# Patient Record
Sex: MALE | URBAN - METROPOLITAN AREA
[De-identification: names, ages, dates, MRNs, and addresses within clinical notes are randomized per-mention and may not be internally consistent; named-entity substitution may affect disease eponyms.]

---

## 2020-06-10 ENCOUNTER — APPOINTMENT (RX ONLY)
Dept: URBAN - METROPOLITAN AREA CLINIC 45 | Facility: CLINIC | Age: 25
Setting detail: DERMATOLOGY
End: 2020-06-10

## 2020-06-10 DIAGNOSIS — L51.0 NONBULLOUS ERYTHEMA MULTIFORME: ICD-10-CM

## 2020-06-10 DIAGNOSIS — L85.3 XEROSIS CUTIS: ICD-10-CM

## 2020-06-10 DIAGNOSIS — D22 MELANOCYTIC NEVI: ICD-10-CM

## 2020-06-10 PROBLEM — L30.9 DERMATITIS, UNSPECIFIED: Status: ACTIVE | Noted: 2020-06-10

## 2020-06-10 PROBLEM — D22.5 MELANOCYTIC NEVI OF TRUNK: Status: ACTIVE | Noted: 2020-06-10

## 2020-06-10 PROCEDURE — 99203 OFFICE O/P NEW LOW 30 MIN: CPT | Mod: 25

## 2020-06-10 PROCEDURE — ? COUNSELING

## 2020-06-10 PROCEDURE — 11105 PUNCH BX SKIN EA SEP/ADDL: CPT

## 2020-06-10 PROCEDURE — ? TREATMENT REGIMEN

## 2020-06-10 PROCEDURE — 11104 PUNCH BX SKIN SINGLE LESION: CPT

## 2020-06-10 PROCEDURE — ? BIOPSY BY PUNCH METHOD

## 2020-06-10 PROCEDURE — ? PRESCRIPTION

## 2020-06-10 ASSESSMENT — LOCATION SIMPLE DESCRIPTION DERM
LOCATION SIMPLE: LEFT CLAVICULAR SKIN
LOCATION SIMPLE: LEFT HAND
LOCATION SIMPLE: RIGHT HAND

## 2020-06-10 ASSESSMENT — LOCATION ZONE DERM
LOCATION ZONE: TRUNK
LOCATION ZONE: HAND

## 2020-06-10 ASSESSMENT — LOCATION DETAILED DESCRIPTION DERM
LOCATION DETAILED: LEFT RADIAL PALM
LOCATION DETAILED: RIGHT ULNAR PALM
LOCATION DETAILED: LEFT DORSAL RING METACARPOPHALANGEAL JOINT
LOCATION DETAILED: LEFT ULNAR DORSAL HAND
LOCATION DETAILED: LEFT CLAVICULAR SKIN

## 2020-06-10 NOTE — PROCEDURE: TREATMENT REGIMEN
Detail Level: Zone
Plan: Location: Lips, mouth, hands, feet\\nPrescribed: Prednisone 20mg take 2 tablets daily.\\nPharmacy: [facility]\\n\\nNew Pt\\nPhotos taken\\n\\nPt comes in today with severe concern of blistering on mouth, hands and feet\\nPt states this started about a week and a half ago.\\nWhat he recalls is that he went for his Remicade Infusion Treatment \\nPt has been on Remicaide for Chrons disease for 10 years\\nAfter his treatment it was about 2 days later started with this blistering.\\nPt states the blisters started in his mouth and the top of his pallet and then it moved on to his lips.\\nPt states after a day having them the blistering started on his hands and as well feet.\\nHe went to see his PCP Dr. Cipriano Avila and was given a Prednisone injection and was given a Z-pack \\nThe next day he got a call back from PCP and was told to not take it, so he stopped\\nHe was also given a lidocaine mouth wash to allow him to at least drink water.\\nHe also was put on amoxicillin and the inflammation went down\\nHis mouth is very sore and pt stated that today’s picture is actually shows improvement about 20 times better\\nHis PCP gave him Valtrex yesterday 06/09/2020\\nPt states he is taking 3 Valtrex a daily\\n\\nDiscussed with pt:\\nI think that this is likely ERYTHEMA MULTIFORMA----though could be Transepidermal Necrosis or Simeon-Maykel or even Pemphigus Vulgaris\\nWITH ANY OF THESE DIAGNOSES----PREDNISONE WILL BE HELPFUL (40MG DAILY)\\nI don't really think that is caused by the Remicade---he has been on it for many years\\nI want him to continue his Valacyclovir to treat a possible HSV that might be spurring on EM---also on the Amoxicillin to cover for Mycoplasma which can also be a factor\\nRelative to medium high dose of steroid will help him and I will prescribe him Prednisone 20mg take two tablets daily\\nThe steroid pack that was given to him was not strong enough, and he does not have to be off the steroids---which I think was done in case this might be an infection\\nI REALLY DON'T THINK THAT THIS IS INFECTION AND I THINK THAT THE STEROIDS WILL BE CRUCIAL TO HIM GETTING BACK TO NORMAL\\nToday I will perform a punch biopsy to  determine his diagnosis and we will check on him tomorrow \\nI will see him in one week even though the sutures do not come out until in 2 weeks.\\nPt is to call if he worsens. \\n\\nFollow up 1 week.\\n

## 2020-06-10 NOTE — PROCEDURE: BIOPSY BY PUNCH METHOD
Detail Level: Detailed
Was A Bandage Applied: Yes
Punch Size In Mm: 6
Biopsy Type: H and E
Anesthesia Type: 1% lidocaine with epinephrine
Anesthesia Volume In Cc (Will Not Render If 0): 0.5
Additional Anesthesia Volume In Cc (Will Not Render If 0): 0
Hemostasis: None
Epidermal Sutures: 4-0 Ethilon
Wound Care: Bacitracin
Dressing: bandage
Suture Removal: 7 days
Patient Will Remove Sutures At Home?: No
Lab: [id number]
Lab Facility: [id number]
Consent: Written consent was obtained and risks were reviewed including but not limited to scarring, infection, bleeding, scabbing, incomplete removal, nerve damage and allergy to anesthesia.
Post-Care Instructions: I reviewed with the patient in detail post-care instructions. Patient is to keep the biopsy site dry overnight, and then apply bacitracin twice daily until healed. Patient may apply hydrogen peroxide soaks to remove any crusting.
Home Suture Removal Text: Patient was provided a home suture removal kit and will remove their sutures at home.  If they have any questions or difficulties they will call the office.
Notification Instructions: Patient will be notified of biopsy results. However, patient instructed to call the office if not contacted within 2 weeks.
Billing Type: Third-Party Bill
Information: Selecting Yes will display possible errors in your note based on the variables you have selected. This validation is only offered as a suggestion for you. PLEASE NOTE THAT THE VALIDATION TEXT WILL BE REMOVED WHEN YOU FINALIZE YOUR NOTE. IF YOU WANT TO FAX A PRELIMINARY NOTE YOU WILL NEED TO TOGGLE THIS TO 'NO' IF YOU DO NOT WANT IT IN YOUR FAXED NOTE.
Punch Size In Mm: 4
Deep Sutures: 5-0 Vicryl
Epidermal Sutures: 5-0 Ethilon
Lab: [id number]
Lab Facility: [id number]
Path Notes (To The Dermatopathologist): DIF immunoflourescence
Home Suture Removal Text: Patient was provided a home suture removal kit and will remove their sutures at home.  If they have any questions or difficulties they will call the office.
Billing Type: Third-Party Bill

## 2020-06-17 ENCOUNTER — APPOINTMENT (RX ONLY)
Dept: URBAN - METROPOLITAN AREA CLINIC 45 | Facility: CLINIC | Age: 25
Setting detail: DERMATOLOGY
End: 2020-06-17

## 2020-06-17 ENCOUNTER — RX ONLY (OUTPATIENT)
Age: 25
Setting detail: RX ONLY
End: 2020-06-17

## 2020-06-17 DIAGNOSIS — L51.0 NONBULLOUS ERYTHEMA MULTIFORME: ICD-10-CM

## 2020-06-17 DIAGNOSIS — L85.3 XEROSIS CUTIS: ICD-10-CM

## 2020-06-17 PROCEDURE — ? COUNSELING

## 2020-06-17 PROCEDURE — ? TREATMENT REGIMEN

## 2020-06-17 PROCEDURE — 99213 OFFICE O/P EST LOW 20 MIN: CPT

## 2020-06-17 PROCEDURE — ? PRESCRIPTION

## 2020-06-17 ASSESSMENT — LOCATION SIMPLE DESCRIPTION DERM
LOCATION SIMPLE: LEFT HAND
LOCATION SIMPLE: RIGHT HAND

## 2020-06-17 ASSESSMENT — LOCATION DETAILED DESCRIPTION DERM
LOCATION DETAILED: RIGHT ULNAR PALM
LOCATION DETAILED: LEFT RADIAL PALM

## 2020-06-17 ASSESSMENT — LOCATION ZONE DERM: LOCATION ZONE: HAND

## 2020-06-17 NOTE — PROCEDURE: TREATMENT REGIMEN
Detail Level: Zone
Plan: Location: Lips, mouth, hands, feet\\nFinished RX: Prednisone 20mg take 2 tablets daily, Valtrex, Z-Pack \\nPrescribe: prednisone from 2 days of 40mg, 4 days of 20mg, 4 days of 10mg (10 tablets)\\n                  Cyclosporine 400mg QD x 7 days \\nWeight: 233 lbs \\n\\nPatient is here for a one week follow up to go over his pathology report \\nHe completed his round of prednisone, Z-Pack, and Valtrex without any complications\\nPatient informed me that his last Remicade treatment was on 6/2/2020 for his Crohns \\nWent over his biopsy report and informed him of results, erythema multiforme\\nInformed him that it is very uncommon to have a serious outbreak of erythema multiforme. Something had triggered his immune system to flare up and respond this way.\\nHis skin has showed mild improvement after taking the prednisone and the sores in his mouth is clearing\\nHe states that his toes are not as swollen as before.\\n\\nToday’s treatment: \\n1) Taper down on prednisone from 2 days of 40mg, 4 days of 20mg, 4 days of 10mg (10 tablets)\\n2) Will have him start on cyclosporine (100kilos) —-400mg for a week (take two tablets twice a day)\\n3) Continue the Valtrex\\n4) discontinue the Z-Pack medication \\nWent over side effects and informed him that it can raise BP if a patient is on it for a long period of time. However, reassured him that this shouldn’t be an issue because he will be on this medication for a week\\nWill have him follow up: 3 weeks\\n—————————————————————————-\\n6/10/2020\\n\\nPt comes in today with severe concern of blistering on mouth, hands and feet\\nPt states this started about a week and a half ago.\\nWhat he recalls is that he went for his Remicade Infusion Treatment \\nPt has been on Remicaide for Chrons disease for 10 years\\nAfter his treatment it was about 2 days later started with this blistering.\\nPt states the blisters started in his mouth and the top of his pallet and then it moved on to his lips.\\nPt states after a day having them the blistering started on his hands and as well feet.\\nHe went to see his PCP Dr. Cipriano Avila and was given a Prednisone injection and was given a Z-pack \\nThe next day he got a call back from PCP and was told to not take it, so he stopped\\nHe was also given a lidocaine mouth wash to allow him to at least drink water.\\nHe also was put on amoxicillin and the inflammation went down\\nHis mouth is very sore and pt stated that today’s picture is actually shows improvement about 20 times better\\nHis PCP gave him Valtrex yesterday 06/09/2020\\nPt states he is taking 3 Valtrex a daily\\n\\nDiscussed with pt:\\nI think that this is likely ERYTHEMA MULTIFORMA----though could be Transepidermal Necrosis or Simeon-Maykel or even Pemphigus Vulgaris\\nWITH ANY OF THESE DIAGNOSES----PREDNISONE WILL BE HELPFUL (40MG DAILY)\\nI don't really think that is caused by the Remicade---he has been on it for many years\\nI want him to continue his Valacyclovir to treat a possible HSV that might be spurring on EM---also on the Amoxicillin to cover for Mycoplasma which can also be a factor\\nRelative to medium high dose of steroid will help him and I will prescribe him Prednisone 20mg take two tablets daily\\nThe steroid pack that was given to him was not strong enough, and he does not have to be off the steroids---which I think was done in case this might be an infection\\nI REALLY DON'T THINK THAT THIS IS INFECTION AND I THINK THAT THE STEROIDS WILL BE CRUCIAL TO HIM GETTING BACK TO NORMAL\\nToday I will perform a punch biopsy to  determine his diagnosis and we will check on him tomorrow \\nI will see him in one week even though the sutures do not come out until in 2 weeks.\\nPt is to call if he worsens. \\n\\nFollow up 1 week.

## 2021-05-26 ENCOUNTER — APPOINTMENT (RX ONLY)
Dept: URBAN - METROPOLITAN AREA CLINIC 45 | Facility: CLINIC | Age: 26
Setting detail: DERMATOLOGY
End: 2021-05-26

## 2021-05-26 DIAGNOSIS — L85.3 XEROSIS CUTIS: ICD-10-CM

## 2021-05-26 DIAGNOSIS — L51.0 NONBULLOUS ERYTHEMA MULTIFORME: ICD-10-CM

## 2021-05-26 PROCEDURE — ? TREATMENT REGIMEN

## 2021-05-26 PROCEDURE — ? COUNSELING

## 2021-05-26 PROCEDURE — 99213 OFFICE O/P EST LOW 20 MIN: CPT

## 2021-05-26 PROCEDURE — ? PRESCRIPTION

## 2021-05-26 RX ORDER — PREDNISONE 20 MG/1
TABLET ORAL
Qty: 15 | Refills: 0 | Status: ERX

## 2021-05-26 ASSESSMENT — LOCATION SIMPLE DESCRIPTION DERM
LOCATION SIMPLE: RIGHT HAND
LOCATION SIMPLE: LEFT HAND

## 2021-05-26 ASSESSMENT — LOCATION DETAILED DESCRIPTION DERM
LOCATION DETAILED: LEFT RADIAL PALM
LOCATION DETAILED: RIGHT ULNAR PALM

## 2021-05-26 ASSESSMENT — LOCATION ZONE DERM: LOCATION ZONE: HAND

## 2021-05-26 NOTE — PROCEDURE: TREATMENT REGIMEN
Detail Level: Zone
Plan: Location: Lips, mouth, hands, feet\\nPrescribe:  PREDNISONE 20MG X 2 DAILY FOR 4 DAYS, 20 MG FOR 3 DAYS & 10MG FOR 2 DAYS\\n                 \\nWeight: 233 lbs \\n\\nPhotos taken\\nLOV 06/2020\\n\\nPt comes in today with a major flare up\\nHe states it started about two week ago even though he had been on Valtrex daily.\\nBEFORE THIS FLARE HE HAD BEEN QUITE CLEAR FOR AN EXTENDED PERIOD OF TIME----Thus he had not thought he needed to do any further treatment such as the CYA that we had prescribed for him earlier\\nPt states prior to the breakout out his PCP had started him on Vitamin D and he did have an infusion of Infliximab for his Crohns\\nHe states there is not anything else he can think that was different.\\nPt states he is a lot of pain; Pt states this time his eyelids are involved.\\nHe comes today for further evaluation and treatment.\\n\\nDiscussed with pt: \\nEvaluating him today and comparing his past photos; this flare is less than when I first saw him in June 2020--THOUGH IT IS SIGNIFICANT\\nI am recommending to put aside the Vitamin D for now.---Though I don't think this is most likely the culprit\\nThe immune system can react to different things that are being taken and flare ups happen.\\nI am also recommending for him to stop any supplements.-----He denies having taken any NSAIDS\\nTaking new medications and a new flare up this coincides together. \\nIf every four month he needs a short period of time of prednisone I have no problem \\nI recommended for him not to miss his next scheduled appointment; LOV WAS A YEAR AND A HALF.\\nIF HE DOESN'T GET BETTER QUICKLY HE IS TO CALL FOR AN EARLIER VISIT----Otherwise we will start him back on a prednisone taper and oral steroids which CLEARED HIM LAST TIME\\nHim coming in lets me establish a relationship with him and start knowing more of his flares and help him prevent them. \\nPT STATES HE IS MOVING TO [place] IN NOVEMBER 2021 FOR A JOB; RECOMMENDED FOR HIM TO BE COMING IN AS SCHEDULED UNTIL THEN.\\Hill WELL TO START FINDING A DERMATOLOGIST OF HIS CHOICE SO THERE CAN BE CONTINUATION OF CARE.\\nPT VOICED UNDERSTANDING.\\nI will like for him to go back in time and think of what ties to theses breakouts he has had so we may discuss at the next office visit\\nHE IS TO LET US KNOW IF HE DOES NOT IMPROVE\\nPLAN:\\nPT WILL BE PRESCIRBED PREDNISONE 20MG; THE FOLLOWING ARE THE INSTRUCTIONS;\\nPREDNISONE 20MG X 2 FOR 4 DAYS, 20 MG FOR 3 DAYS & 10MG FOR 2 DAYS\\nHE IS TO FOLLOW UP IN TWO WEEKS\\nPT VOICED UNDERSTANDING \\n\\nFollow up 2 weeks\\n-------------------------------------------------------------------------------------------------------------\\n\\nPatient is here for a one week follow up to go over his pathology report \\nHe completed his round of prednisone, Z-Pack, and Valtrex without any complications\\nPatient informed me that his last Remicade treatment was on 6/2/2020 for his Crohns \\nWent over his biopsy report and informed him of results, erythema multiforme\\nInformed him that it is very uncommon to have a serious outbreak of erythema multiforme. Something had triggered his immune system to flare up and respond this way.\\nHis skin has showed mild improvement after taking the prednisone and the sores in his mouth is clearing\\nHe states that his toes are not as swollen as before.\\n\\nDiscussed with pt:\\n1) Taper down on prednisone from 2 days of 40mg, 4 days of 20mg, 4 days of 10mg (10 tablets)\\n2) Will have him start on cyclosporine (100kilos) —-400mg for a week (take two tablets twice a day)\\n3) Continue the Valtrex\\n4) discontinue the Z-Pack medication \\nWent over side effects and informed him that it can raise BP if a patient is on it for a long period of time. However, reassured him that this shouldn’t be an issue because he will be on this medication for a week\\nWill have him follow up: 3 weeks\\n—————————————————————————-\\n6/10/2020\\n\\nPt comes in today with severe concern of blistering on mouth, hands and feet\\nPt states this started about a week and a half ago.\\nWhat he recalls is that he went for his Remicade Infusion Treatment \\nPt has been on Remicaide for Chrons disease for 10 years\\nAfter his treatment it was about 2 days later started with this blistering.\\nPt states the blisters started in his mouth and the top of his pallet and then it moved on to his lips.\\nPt states after a day having them the blistering started on his hands and as well feet.\\nHe went to see his PCP Dr. Cipriano Avila and was given a Prednisone injection and was given a Z-pack \\nThe next day he got a call back from PCP and was told to not take it, so he stopped\\nHe was also given a lidocaine mouth wash to allow him to at least drink water.\\nHe also was put on amoxicillin and the inflammation went down\\nHis mouth is very sore and pt stated that today’s picture is actually shows improvement about 20 times better\\nHis PCP gave him Valtrex yesterday 06/09/2020\\nPt states he is taking 3 Valtrex a daily\\n\\nDiscussed with pt:\\nI think that this is likely ERYTHEMA MULTIFORMA----though could be Transepidermal Necrosis or Simeon-Maykel or even Pemphigus Vulgaris\\nWITH ANY OF THESE DIAGNOSES----PREDNISONE WILL BE HELPFUL (40MG DAILY)\\nI don't really think that is caused by the Remicade---he has been on it for many years\\nI want him to continue his Valacyclovir to treat a possible HSV that might be spurring on EM---also on the Amoxicillin to cover for Mycoplasma which can also be a factor\\nRelative to medium high dose of steroid will help him and I will prescribe him Prednisone 20mg take two tablets daily\\nThe steroid pack that was given to him was not strong enough, and he does not have to be off the steroids---which I think was done in case this might be an infection\\nI REALLY DON'T THINK THAT THIS IS INFECTION AND I THINK THAT THE STEROIDS WILL BE CRUCIAL TO HIM GETTING BACK TO NORMAL\\nToday I will perform a punch biopsy to  determine his diagnosis and we will check on him tomorrow \\nI will see him in one week even though the sutures do not come out until in 2 weeks.\\nPt is to call if he worsens. \\n\\nFollow up 1 week.

## 2021-06-09 ENCOUNTER — RX ONLY (OUTPATIENT)
Age: 26
Setting detail: RX ONLY
End: 2021-06-09

## 2021-06-09 ENCOUNTER — APPOINTMENT (RX ONLY)
Dept: URBAN - METROPOLITAN AREA CLINIC 45 | Facility: CLINIC | Age: 26
Setting detail: DERMATOLOGY
End: 2021-06-09

## 2021-06-09 DIAGNOSIS — L85.3 XEROSIS CUTIS: ICD-10-CM

## 2021-06-09 DIAGNOSIS — L51.0 NONBULLOUS ERYTHEMA MULTIFORME: ICD-10-CM

## 2021-06-09 PROCEDURE — 99213 OFFICE O/P EST LOW 20 MIN: CPT

## 2021-06-09 PROCEDURE — ? TREATMENT REGIMEN

## 2021-06-09 PROCEDURE — ? COUNSELING

## 2021-06-09 RX ORDER — VALACYCLOVIR HYDROCHLORIDE 1 G/1
TABLET, FILM COATED ORAL
Qty: 90 | Refills: 1 | Status: ERX | COMMUNITY
Start: 2021-06-09

## 2021-06-09 RX ORDER — PREDNISONE 20 MG/1
TABLET ORAL
Qty: 15 | Refills: 0 | Status: CANCELLED
Stop reason: CLARIF

## 2021-06-09 ASSESSMENT — LOCATION SIMPLE DESCRIPTION DERM
LOCATION SIMPLE: LEFT HAND
LOCATION SIMPLE: RIGHT HAND

## 2021-06-09 ASSESSMENT — LOCATION ZONE DERM: LOCATION ZONE: HAND

## 2021-06-09 ASSESSMENT — LOCATION DETAILED DESCRIPTION DERM
LOCATION DETAILED: RIGHT ULNAR PALM
LOCATION DETAILED: LEFT RADIAL PALM

## 2021-06-09 NOTE — PROCEDURE: TREATMENT REGIMEN
Detail Level: Zone
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\\nTHUS IT IS IMPORTANT TO TRY TO CHANGE THIS MEDICATION TO TREAT HIS CROHNS DISEASE PRIOR TO THE NEXT INJECTION\\nInstructed patient to move his infusion appt back a week so he can see his GI physician BEFORE he gets another infusion\\nWill have him follow up in two weeks to make sure he is doing well as his prednisone will be tapering significantly over the next few weeks\\nOur plan is to continue to lower his prednisone dose \\nInstructed to continue taking prednisone as written and will follow up in 2 weeks\\nToday, will refill his Valtrex for him to continue to take as instructed which is 1gram three times daily----he has done this in the past to help with his flares Though it was certainly not working when he came in to see me.\\nFollow up: 2 weeks

## 2021-06-24 ENCOUNTER — APPOINTMENT (RX ONLY)
Dept: URBAN - METROPOLITAN AREA CLINIC 45 | Facility: CLINIC | Age: 26
Setting detail: DERMATOLOGY
End: 2021-06-24

## 2021-06-24 DIAGNOSIS — L85.3 XEROSIS CUTIS: ICD-10-CM

## 2021-06-24 DIAGNOSIS — L51.1 STEVENS-JOHNSON SYNDROME: ICD-10-CM

## 2021-06-24 PROCEDURE — ? COUNSELING

## 2021-06-24 PROCEDURE — 99213 OFFICE O/P EST LOW 20 MIN: CPT

## 2021-06-24 PROCEDURE — ? TREATMENT REGIMEN

## 2021-06-24 ASSESSMENT — LOCATION ZONE DERM
LOCATION ZONE: HAND
LOCATION ZONE: LIP

## 2021-06-24 ASSESSMENT — LOCATION DETAILED DESCRIPTION DERM
LOCATION DETAILED: LEFT RADIAL PALM
LOCATION DETAILED: LEFT INFERIOR VERMILION LIP
LOCATION DETAILED: RIGHT ULNAR PALM

## 2021-06-24 ASSESSMENT — LOCATION SIMPLE DESCRIPTION DERM
LOCATION SIMPLE: RIGHT HAND
LOCATION SIMPLE: LEFT LIP
LOCATION SIMPLE: LEFT HAND

## 2021-06-24 ASSESSMENT — SEVERITY ASSESSMENT: SEVERITY: CLEAR

## 2021-06-24 ASSESSMENT — PAIN INTENSITY VAS: HOW INTENSE IS YOUR PAIN 0 BEING NO PAIN, 10 BEING THE MOST SEVERE PAIN POSSIBLE?: NO PAIN

## 2021-06-24 NOTE — PROCEDURE: TREATMENT REGIMEN
Detail Level: Zone
Plan: Location: Lips, mouth, hands, feet\\nPreviously treatment:  PREDNISONE 20MG X 2 DAILY FOR 4 DAYS, 20 MG FOR 3 DAYS & 10MG FOR 2 days\\nWeight: 233 lbs (discontinued last week) \\nCurrently on: Remicade\\n\\nPatient is here for a follow up and is no longer on prednisone. \\nHe informed me that he took his last dose of prednisone last week \\nPatient states he has an appointment with his GI physician on 7/23/2021 to go over his medications \\nWill go ahead and send out an emergency prednisone Three tablets a day for one week in case he has another flare up \\nPatient states that he noticed when he would have flare ups that he felt a cold sore developing in which he would take Valtrex 1gm\\nI'M STILL SOMEWHAT SUSPICIOUS OF THE REMICADE AS IT DOES HAVE SUPPORT IN THE LITERATURE AS A SOURCE FOR MAGDALENA RICHEY\\n----However, it could be that he is just very highly susceptible to HSV activation of the SJS (more with Erythema multiforme and this episode was way too extensive to consider that as the diagnosis)-----So he should definitely continue his Valtrex 1gm daily suppressive dosing\\n\\n\\nToday’s plan:\\n1) will keep him on Valtrex 1gm a day that he gets through his PCP\\n2) instructed patient whenever he sees his GI physician in July, to send medical records over ----Might consider switching off of Remicade\\n3) He is currently clear and no longer has any visible flare ups today \\n4) will follow up in 3 months

## 2021-08-16 ENCOUNTER — RX ONLY (OUTPATIENT)
Age: 26
Setting detail: RX ONLY
End: 2021-08-16

## 2021-08-16 RX ORDER — PREDNISONE 20 MG/1
TABLET ORAL
Qty: 15 | Refills: 0 | Status: ERX | COMMUNITY
Start: 2021-08-16

## 2021-09-23 ENCOUNTER — APPOINTMENT (RX ONLY)
Dept: URBAN - METROPOLITAN AREA CLINIC 45 | Facility: CLINIC | Age: 26
Setting detail: DERMATOLOGY
End: 2021-09-23

## 2021-09-23 DIAGNOSIS — L85.3 XEROSIS CUTIS: ICD-10-CM

## 2021-09-23 DIAGNOSIS — B37.2 CANDIDIASIS OF SKIN AND NAIL: ICD-10-CM

## 2021-09-23 DIAGNOSIS — L51.1 STEVENS-JOHNSON SYNDROME: ICD-10-CM

## 2021-09-23 PROBLEM — L08.9 LOCAL INFECTION OF THE SKIN AND SUBCUTANEOUS TISSUE, UNSPECIFIED: Status: ACTIVE | Noted: 2021-09-23

## 2021-09-23 PROCEDURE — 11102 TANGNTL BX SKIN SINGLE LES: CPT

## 2021-09-23 PROCEDURE — ? PRESCRIPTION

## 2021-09-23 PROCEDURE — ? COUNSELING

## 2021-09-23 PROCEDURE — ? BIOPSY BY SHAVE METHOD

## 2021-09-23 PROCEDURE — 99214 OFFICE O/P EST MOD 30 MIN: CPT | Mod: 25

## 2021-09-23 PROCEDURE — ? TREATMENT REGIMEN

## 2021-09-23 ASSESSMENT — LOCATION ZONE DERM
LOCATION ZONE: ORAL_CAVITY
LOCATION ZONE: HAND
LOCATION ZONE: LIP

## 2021-09-23 ASSESSMENT — LOCATION SIMPLE DESCRIPTION DERM
LOCATION SIMPLE: LEFT HAND
LOCATION SIMPLE: MIDLINE VENTRAL TONGUE
LOCATION SIMPLE: RIGHT HAND
LOCATION SIMPLE: LEFT LIP

## 2021-09-23 ASSESSMENT — LOCATION DETAILED DESCRIPTION DERM
LOCATION DETAILED: LEFT RADIAL PALM
LOCATION DETAILED: POSTERIOR MIDLINE VENTRAL TONGUE
LOCATION DETAILED: LEFT INFERIOR VERMILION LIP
LOCATION DETAILED: RIGHT ULNAR PALM

## 2021-09-23 NOTE — PROCEDURE: BIOPSY BY SHAVE METHOD

## 2021-09-28 RX ORDER — CLOBETASOL PROPIONATE 0.5 MG/G
AEROSOL, FOAM TOPICAL
Qty: 50 | Refills: 2 | Status: ERX | COMMUNITY
Start: 2021-09-28

## 2021-09-28 RX ORDER — PREDNISONE 20 MG/1
TABLET ORAL
Qty: 15 | Refills: 0 | Status: ERX

## 2021-09-28 RX ADMIN — CLOBETASOL PROPIONATE: 0.5 AEROSOL, FOAM TOPICAL at 00:00

## 2021-10-14 ENCOUNTER — RX ONLY (OUTPATIENT)
Age: 26
Setting detail: RX ONLY
End: 2021-10-14

## 2021-10-14 ENCOUNTER — APPOINTMENT (RX ONLY)
Dept: URBAN - METROPOLITAN AREA CLINIC 45 | Facility: CLINIC | Age: 26
Setting detail: DERMATOLOGY
End: 2021-10-14

## 2021-10-14 DIAGNOSIS — L85.3 XEROSIS CUTIS: ICD-10-CM

## 2021-10-14 DIAGNOSIS — L51.1 STEVENS-JOHNSON SYNDROME: ICD-10-CM

## 2021-10-14 DIAGNOSIS — L01.01 NON-BULLOUS IMPETIGO: ICD-10-CM

## 2021-10-14 PROCEDURE — ? TREATMENT REGIMEN

## 2021-10-14 PROCEDURE — 99214 OFFICE O/P EST MOD 30 MIN: CPT

## 2021-10-14 PROCEDURE — ? COUNSELING

## 2021-10-14 PROCEDURE — ? PRESCRIPTION

## 2021-10-14 RX ORDER — SULFAMETHOXAZOLE AND TRIMETHOPRIM 800; 160 MG/1; MG/1
TABLET ORAL
Qty: 28 | Refills: 0 | Status: CANCELLED
Stop reason: CLARIF

## 2021-10-14 RX ORDER — CLOBETASOL PROPIONATE 0.5 MG/G
AEROSOL, FOAM TOPICAL
Qty: 50 | Refills: 2 | Status: CANCELLED
Stop reason: CLARIF

## 2021-10-14 RX ORDER — MUPIROCIN 20 MG/G
OINTMENT TOPICAL
Qty: 22 | Refills: 2 | Status: CANCELLED
Stop reason: CLARIF

## 2021-10-14 RX ORDER — LEVOFLOXACIN 500 MG/1
TABLET, FILM COATED ORAL
Qty: 10 | Refills: 0 | Status: ERX | COMMUNITY
Start: 2021-10-14

## 2021-10-14 RX ORDER — PREDNISONE 20 MG/1
TABLET ORAL
Qty: 42 | Refills: 2 | Status: ERX

## 2021-10-14 ASSESSMENT — LOCATION ZONE DERM
LOCATION ZONE: HAND
LOCATION ZONE: LIP

## 2021-10-14 ASSESSMENT — LOCATION SIMPLE DESCRIPTION DERM
LOCATION SIMPLE: LEFT HAND
LOCATION SIMPLE: RIGHT HAND
LOCATION SIMPLE: LEFT LIP

## 2021-10-14 ASSESSMENT — LOCATION DETAILED DESCRIPTION DERM
LOCATION DETAILED: LEFT RADIAL PALM
LOCATION DETAILED: RIGHT ULNAR PALM
LOCATION DETAILED: LEFT INFERIOR VERMILION LIP

## 2021-10-14 NOTE — PROCEDURE: TREATMENT REGIMEN
Detail Level: Zone
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 \\n\\n\\nDiscussed with the pt that upon further examination I do not believe lesion located on the corner of his mouth is associated to his current condition.\\nToday, I will be performing a culture to provide the patient with reassurance that this is simply a staff infection. \\nDiscussed with the pt that I will provide him a bulk supply of oral Prednisone for him to take as a surplus for future flares since he will be out of the state.\\nReiterated to the pt that instruction on the medication will state for him to take oral medication 3x daily for two weeks; however he is aware he is only to take as needed. \\nDiscussed with the pt that although he does believer lesion located on his lip is not associated with the HSV virus I would still like for him to remain vigilant and take Valtrex as needed. \\nPt is to continue regimen discussed at today's visit and follow up if possible or still in the state of [place]. \\n\\n\\n\\nFollow up 6 wk \\n\\n\\n\\n\\n\\n
Plan: Location: lip\\nPrescribe:Levofloxacin 500 mg.\\n\\n\\n\\nPt has irritation around mouth today.\\nPt discussed that it is very itchy and inflamed today.\\nToday there is honey yellow crust developing around the inflammation.\\nDiscussed with pt that this is most likely impetigo.\\nDiscussed with pt that this is a bacterial skin infection that often comes on irritated skin.\\nWill culture today, which will allows us to start him on the best possible antibiotic.\\nDiscussed being gentle with the skin---no peroxide or etoh, no neosporin.\\n\\nWill also prescribe pt levofloxacin 500mg he’s to take 1 daily for the next 10days.\\n\\nF/u as needed

## 2021-11-17 ENCOUNTER — APPOINTMENT (RX ONLY)
Dept: URBAN - METROPOLITAN AREA CLINIC 45 | Facility: CLINIC | Age: 26
Setting detail: DERMATOLOGY
End: 2021-11-17

## 2021-11-17 DIAGNOSIS — L85.3 XEROSIS CUTIS: ICD-10-CM

## 2021-11-17 DIAGNOSIS — L51.1 STEVENS-JOHNSON SYNDROME: ICD-10-CM

## 2021-11-17 PROCEDURE — 99214 OFFICE O/P EST MOD 30 MIN: CPT

## 2021-11-17 PROCEDURE — ? COUNSELING

## 2021-11-17 PROCEDURE — ? PRESCRIPTION

## 2021-11-17 PROCEDURE — ? TREATMENT REGIMEN

## 2021-11-17 RX ORDER — PREDNISONE 20 MG/1
TABLET ORAL
Qty: 42 | Refills: 2 | Status: CANCELLED
Stop reason: CLARIF

## 2021-11-17 ASSESSMENT — LOCATION DETAILED DESCRIPTION DERM
LOCATION DETAILED: RIGHT ULNAR PALM
LOCATION DETAILED: LEFT RADIAL PALM
LOCATION DETAILED: LEFT INFERIOR VERMILION LIP

## 2021-11-17 ASSESSMENT — LOCATION SIMPLE DESCRIPTION DERM
LOCATION SIMPLE: LEFT LIP
LOCATION SIMPLE: LEFT HAND
LOCATION SIMPLE: RIGHT HAND

## 2021-11-17 ASSESSMENT — LOCATION ZONE DERM
LOCATION ZONE: HAND
LOCATION ZONE: LIP

## 2021-11-17 NOTE — PROCEDURE: TREATMENT REGIMEN
Detail Level: Zone
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\\nPt was previously seen on 10/14/2021\\nPt states since his last follow up visit his condition has remained under control, so much so that he has discontinued daily use of oral Valtrex.\\nPt states he did complete his course of Levofloxacin 500mg for ten days\\nPt did have oral Prednisone on hand just in the case he experienced a flare up an had to use it about a week ago\\nWhat he found strange was that his flare up he had was only on his hands and it usually started in his mouth\\nHe states he does have small sores in his mouth but do not feel like the Simeon-Maykel Syndrome\\nHis course of PREDNISONE HE TOOK A WEEK AGO WAS THE FOLLOWING;\\nFOR 3 DAYS HE TOOK 3 OF 20MG'S DAILY\\nFOR 3 MORE DAYS' HE TOOK 2 OF 20MG'S DAILY\\nFOR 3 MORE DAYS HE TOOK 1 OF 20MG'S DAILY \\nTHIS SEEMED TO BE THE DOSE HE NEEDED TO GET CLEAR \\nPt states his next Stelara injection is in 12/24/21; by that time he will be in [place]\\nThis is his last follow up with this office\\n\\n\\nDiscussed with the pt that upon further examination he is clear today\\nReiterated that I did not believe lesion that was located on the inside of his mouth was associated to his current condition.\\nWhich tells me clearing up with the Levofloxacin this was just a staff infection. \\nSince he is moving to [place]; I can give him a bulk of  Prednisone for him to take as a surplus for future flares since he will be out of the state.\\nReiterated to the pt that instruction on the medication will state for him to take oral medication 3x daily for two weeks; however he is aware he is only to take as needed. \\nDiscussed with the pt that although he does believer lesion located on his lip is not associated with the HSV virus I would still like for him to remain vigilant and take Valtrex as needed. \\nPt is to continue regimen discussed at today's visit and follow up if possible or still in the state of [place]. \\nWe will forward medical records to his new providers\\n\\n\\nPRN\\n\\n\\n\\n\\n\\n